# Patient Record
Sex: MALE | Race: BLACK OR AFRICAN AMERICAN | NOT HISPANIC OR LATINO | Employment: OTHER | URBAN - METROPOLITAN AREA
[De-identification: names, ages, dates, MRNs, and addresses within clinical notes are randomized per-mention and may not be internally consistent; named-entity substitution may affect disease eponyms.]

---

## 2018-03-26 ENCOUNTER — TREATMENT (OUTPATIENT)
Dept: PHYSICAL THERAPY | Facility: CLINIC | Age: 68
End: 2018-03-26

## 2018-03-26 ENCOUNTER — TRANSCRIBE ORDERS (OUTPATIENT)
Dept: PHYSICAL THERAPY | Facility: CLINIC | Age: 68
End: 2018-03-26

## 2018-03-26 DIAGNOSIS — Z96.652 H/O TOTAL KNEE REPLACEMENT, LEFT: Primary | ICD-10-CM

## 2018-03-26 DIAGNOSIS — G89.29 CHRONIC PAIN OF LEFT KNEE: Primary | ICD-10-CM

## 2018-03-26 DIAGNOSIS — Z47.89 ORTHOPEDIC AFTERCARE: ICD-10-CM

## 2018-03-26 DIAGNOSIS — M25.562 CHRONIC PAIN OF LEFT KNEE: Primary | ICD-10-CM

## 2018-03-26 DIAGNOSIS — Z96.652 TOTAL KNEE REPLACEMENT STATUS, LEFT: ICD-10-CM

## 2018-03-26 DIAGNOSIS — M25.562 LEFT KNEE PAIN, UNSPECIFIED CHRONICITY: ICD-10-CM

## 2018-03-26 PROCEDURE — 97110 THERAPEUTIC EXERCISES: CPT | Performed by: PHYSICAL THERAPIST

## 2018-03-26 PROCEDURE — 97161 PT EVAL LOW COMPLEX 20 MIN: CPT | Performed by: PHYSICAL THERAPIST

## 2018-03-26 NOTE — PROGRESS NOTES
Physical Therapy Initial Evaluation and Plan of Care    Patient: Sen Arellano   : 1950  Diagnosis/ICD-10 Code:  Chronic pain of left knee [M25.562, G89.29]  Referring practitioner: No ref. provider found  Date of Initial Visit: 3/26/2018  Today's Date: 3/26/2018  Patient seen for 1 sessions             Subjective Evaluation    History of Present Illness  Date of surgery: 3/1/2018  Mechanism of injury: Pt states that he has been having issues with the left knee for a while and eventually had surgery for left TKA. After he was discharged from the hospital he has home health PT up until 3/23/18. He feels like the pain has decreased with home health and he has been able to get around easier using the walker. He does have c/o pain in the back of the left knee where he caused a wound from wrapping his ice to tight.       Patient Occupation: N/A Quality of life: good    Pain  Current pain rating: 3  At best pain ratin  At worst pain ratin  Location: Left knee  Quality: dull ache  Relieving factors: ice, rest and medications  Aggravating factors: ambulation, standing and movement  Progression: improved    Social Support  Lives in: multiple-level home ([facility])    Treatments  Previous treatment: home therapy  Discharged from (in last 30 days): inpatient hospitalization and home health care  Patient Goals  Patient goals for therapy: decreased edema, decreased pain, increased motion, improved balance, increased strength and independence with ADLs/IADLs             Objective       Observations     Additional Observation Details  Open, but healing wound noted at the popliteal space of the left LE. Pt reports that he cut the back of his knee when he cinched his ice pack too tight on his knee     Palpation     Additional Palpation Details  TTP generally about the left knee     Active Range of Motion   Left Knee   Flexion: 99 degrees   Extension: Left knee active extension: -23.     Right Knee   Flexion: 110  degrees   Extension: Right knee active extension: -12.     Patellar Mobility   Left Knee Hypomobile in the left medial, left lateral, left superior and left inferior patellar tendon(s).     Strength/Myotome Testing     Left Knee   Flexion: 4  Extension: 4+    Ambulation     Ambulation: Level Surfaces     Additional Level Surfaces Ambulation Details  Pt ambulates with a rolling walker. He demonstrates decreased weight bearing on the LLE with a decrease in terminal knee extension and flexion during ambulation          Assessment & Plan     Assessment  Impairments: abnormal gait, abnormal muscle tone, abnormal or restricted ROM, activity intolerance, impaired balance, impaired physical strength, lacks appropriate home exercise program, pain with function and weight-bearing intolerance  Assessment details: Patient is a 68 year old male who comes to physical therapy s/p left TKA. Resulting in pain, decreased ROM, decreased strength, and inability to perform all essential functional activities. Pt will benefit from skilled PT services to address the above issues.     Prognosis details:   SHORT TERM GOALS:  2 weeks       1. Pt independent with HEP  2. Pt to demonstrate yojana hip strength 4/5 or greater to improve stability with ambulation  3. Pt to report being able to walk in the clinic without AD without increasing pain in the left knee    LONG TERM GOALS:   6 weeks  1. Pt to demonstrate ability to perform full functional squat with good form and control of the knees and without increasing pain  2. Pt to demonstrate yojana hip strength to 4+/5 or greater to improve safety with ambulation on uneven surfaces  3. Pt to demonstrate ability to perform step up/down 6 inch step x10 safely and without pain in the left knee     Functional Limitations: carrying objects, lifting, walking, uncomfortable because of pain, sitting and standing  Plan  Therapy options: will be seen for skilled physical therapy services  Planned modality  interventions: cryotherapy, electrical stimulation/Russian stimulation, high voltage pulsed current (pain management), iontophoresis, microcurrent electrical stimulation, TENS, thermotherapy (hydrocollator packs) and ultrasound  Planned therapy interventions: abdominal trunk stabilization, ADL retraining, balance/weight-bearing training, body mechanics training, flexibility, functional ROM exercises, gait training, home exercise program, IADL retraining, joint mobilization, manual therapy, motor coordination training, neuromuscular re-education, soft tissue mobilization, strengthening, stretching and therapeutic activities  Frequency: 2x week  Duration in weeks: 6  Treatment plan discussed with: patient        Manual Therapy:         mins  99674;  Therapeutic Exercise:    25     mins  42546;     Neuromuscular Fan:        mins  77271;    Therapeutic Activity:          mins  01880;     Gait Training:           mins  51895;     Ultrasound:          mins  01133;    Electrical Stimulation:         mins  75830 ( );  Dry Needling          mins self-pay    Timed Treatment:   25   mins   Total Treatment:     60   mins    PT SIGNATURE: Gerardo Painting, DANITZA   DATE TREATMENT INITIATED: 3/26/2018    Initial Certification  Certification Period: 6/24/2018  I certify that the therapy services are furnished while this patient is under my care.  The services outlined above are required by this patient, and will be reviewed every 90 days.     PHYSICIAN:       DATE:     Please sign and return via fax to [phone].. Thank you, [facility].

## 2018-03-30 ENCOUNTER — TREATMENT (OUTPATIENT)
Dept: PHYSICAL THERAPY | Facility: CLINIC | Age: 68
End: 2018-03-30

## 2018-03-30 DIAGNOSIS — Z96.652 TOTAL KNEE REPLACEMENT STATUS, LEFT: ICD-10-CM

## 2018-03-30 DIAGNOSIS — M25.562 CHRONIC PAIN OF LEFT KNEE: Primary | ICD-10-CM

## 2018-03-30 DIAGNOSIS — G89.29 CHRONIC PAIN OF LEFT KNEE: Primary | ICD-10-CM

## 2018-03-30 PROCEDURE — 97110 THERAPEUTIC EXERCISES: CPT | Performed by: PHYSICAL THERAPIST

## 2018-03-30 PROCEDURE — 97140 MANUAL THERAPY 1/> REGIONS: CPT | Performed by: PHYSICAL THERAPIST

## 2018-03-31 NOTE — PROGRESS NOTES
Physical Therapy Daily Progress Note    Subjective   Sen Arellano reports that he was sore after his last visit but he I feeling better now.     Objective   See Exercise, Manual, and Modality Logs for complete treatment.       Assessment/Plan     Improved PROM noted today in the clinic with manual therapy. WIll continue to progress activity as tolerated.     Progress per Plan of Care           Manual Therapy:    18     mins  22926;  Therapeutic Exercise:    32     mins  01224;     Neuromuscular Fan:        mins  11053;    Therapeutic Activity:          mins  57986;     Gait Training:           mins  05887;     Ultrasound:          mins  71734;    Electrical Stimulation:         mins  45298 ( );  Dry Needling          mins self-pay    Timed Treatment:   50   mins   Total Treatment:     58   mins    Gerardo Painting, PT  Physical Therapist

## 2018-04-02 ENCOUNTER — TREATMENT (OUTPATIENT)
Dept: PHYSICAL THERAPY | Facility: CLINIC | Age: 68
End: 2018-04-02

## 2018-04-02 DIAGNOSIS — G89.29 CHRONIC PAIN OF LEFT KNEE: Primary | ICD-10-CM

## 2018-04-02 DIAGNOSIS — M25.562 CHRONIC PAIN OF LEFT KNEE: Primary | ICD-10-CM

## 2018-04-02 DIAGNOSIS — Z96.652 TOTAL KNEE REPLACEMENT STATUS, LEFT: ICD-10-CM

## 2018-04-02 PROCEDURE — 97140 MANUAL THERAPY 1/> REGIONS: CPT | Performed by: PHYSICAL THERAPIST

## 2018-04-02 PROCEDURE — 97110 THERAPEUTIC EXERCISES: CPT | Performed by: PHYSICAL THERAPIST

## 2018-04-02 NOTE — PROGRESS NOTES
Physical Therapy Daily Progress Note    Subjective   Sen Arellano reports that he was sore after is last visit, but his knee pain seems to have been better. He feels more comfortable with walking in the home.     Objective   See Exercise, Manual, and Modality Logs for complete treatment.       Assessment/Plan     Progressed exercise in the clinic today with more isolated strengthening. Included the bike at the end of treatment to address any issues with endurance and ROM of the knee. Pt was able to perform a full revolution on the bike without pain.     Progress per Plan of Care and Progress strengthening /stabilization /functional activity           Manual Therapy:    18     mins  12856;  Therapeutic Exercise:    45     mins  12548;     Neuromuscular Fan:        mins  48221;    Therapeutic Activity:          mins  94226;     Gait Training:           mins  71011;     Ultrasound:          mins  71375;    Electrical Stimulation:         mins  61218 ( );  Dry Needling          mins self-pay    Timed Treatment:   63   mins   Total Treatment:     70   mins    Gerardo Painting PT  Physical Therapist

## 2018-04-06 ENCOUNTER — TREATMENT (OUTPATIENT)
Dept: PHYSICAL THERAPY | Facility: CLINIC | Age: 68
End: 2018-04-06

## 2018-04-06 DIAGNOSIS — G89.29 CHRONIC PAIN OF LEFT KNEE: ICD-10-CM

## 2018-04-06 DIAGNOSIS — Z96.652 TOTAL KNEE REPLACEMENT STATUS, LEFT: Primary | ICD-10-CM

## 2018-04-06 DIAGNOSIS — M25.562 CHRONIC PAIN OF LEFT KNEE: ICD-10-CM

## 2018-04-06 PROCEDURE — 97110 THERAPEUTIC EXERCISES: CPT | Performed by: PHYSICAL THERAPIST

## 2018-04-06 NOTE — PROGRESS NOTES
Physical Therapy Daily Progress Note    Patient: Sen Arellano   : 1950  Diagnosis/ICD-10 Code:  Total knee replacement status, left [Z96.652]  Referring practitioner: Eleanor Monreal NP  Date of Initial Visit: Type: THERAPY  Noted: 3/26/2018  Today's Date: 2018  Patient seen for 4 sessions             Subjective   Sen Arellano reports that he is feeling well today and only notices pain while standing and rated pain 3/10 in his left knee. Patient said he does not notice pain when he is sitting or lying down and rated it 0/10. Pt stated he has done some stretching at home. When asked about how he felt after his last visit he stated he felt a little sore like he had been working. Pt stated he feels like he still needs to use walker because of confidence issues.     Objective     See Exercise, Manual, and Modality Logs for complete treatment.       Assessment/Plan: Pt tolerated therapy well and continues to improve strength and ROM. Continued to progress exercises and added new ones to more standing exercises and walking without walker. Pt would benefit from more standing and walking exercises to improve mobility and increase pt's confidence. During standing side stepping pt required CGA with gait belt but would be appropriate for SBA.     Progress per Plan of Care and Progress strengthening /stabilization /functional activity           Manual Therapy:         mins  77977;  Therapeutic Exercise:    55     mins  81205;     Neuromuscular Fan:        mins  34243;    Therapeutic Activity:          mins  77232;     Gait Training:           mins  06685;     Ultrasound:          mins  84109;    Electrical Stimulation:         mins  43701 ( );  Iontophoresis          mins 36685   Traction          mins  51268  Fluidotherapy          mins  21951  Dry Needling          mins self-pay  Paraffin          mins  02609    Timed Treatment:   55   mins   Total Treatment:     75   mins    [employer] PT  Student    I was present in the PT Department guiding the student by approving, concurring, and confirming the skilled judgement for all services rendered.     Gerardo Painting, PT  Physical Therapist

## 2018-04-10 ENCOUNTER — TREATMENT (OUTPATIENT)
Dept: PHYSICAL THERAPY | Facility: CLINIC | Age: 68
End: 2018-04-10

## 2018-04-10 DIAGNOSIS — M25.562 CHRONIC PAIN OF LEFT KNEE: Primary | ICD-10-CM

## 2018-04-10 DIAGNOSIS — G89.29 CHRONIC PAIN OF LEFT KNEE: Primary | ICD-10-CM

## 2018-04-10 DIAGNOSIS — Z96.652 TOTAL KNEE REPLACEMENT STATUS, LEFT: ICD-10-CM

## 2018-04-10 PROCEDURE — 97110 THERAPEUTIC EXERCISES: CPT | Performed by: PHYSICAL THERAPIST

## 2018-04-10 PROCEDURE — 97140 MANUAL THERAPY 1/> REGIONS: CPT | Performed by: PHYSICAL THERAPIST

## 2018-04-10 PROCEDURE — 97530 THERAPEUTIC ACTIVITIES: CPT | Performed by: PHYSICAL THERAPIST

## 2018-04-10 NOTE — PROGRESS NOTES
Physical Therapy Daily Progress Note    Patient: Sen Arellano   : 1950  Diagnosis/ICD-10 Code:  Chronic pain of left knee [M25.562, G89.29]  Referring practitioner: Eleanor Monreal NP  Date of Initial Visit: Type: THERAPY  Noted: 3/26/2018  Today's Date: 4/10/2018  Patient seen for 5 sessions             Subjective   Sen Arellano reports that he is feeling well after the weekend and does not have any pain. The only time he has pain in his left knee is during sit to stand. Pt explained he had more pain in his right knee over the weekend. When asked if he was able to move around without the walker pt stated he was still hesitant and nervous and didn't try much. Stated he does have a cane and may try using that.     Objective     See Exercise, Manual, and Modality Logs for complete treatment.       Assessment/Plan: Pt continues to progress in left knee endurance and strength. Pt started to perform some exercises bilaterally to improve strength and reduce pain on right knee in order to improve safety and stability to ambulate. Pt was able to perform side stepping with SBA without a cane but fatigued and needed a break afterwards. Pt was educated on use of cane and also performed gait with SBA using cane to improve confidence. Continue to progress treatment to more functional standing exercises and improve endurance. Pt may benefit from performing ascending and descending stairs next visit.       Progress per Plan of Care and Progress strengthening /stabilization /functional activity           Manual Therapy:    15     mins  54920;  Therapeutic Exercise:    40     mins  60416;     Neuromuscular Fan:        mins  39104;    Therapeutic Activity:    10      mins  29443;     Gait Training:           mins  85823;     Ultrasound:          mins  74144;    Electrical Stimulation:         mins  09562 ( );  Iontophoresis          mins 97360   Traction          mins  83678  Fluidotherapy          mins  30335  Dry  Needling          mins self-pay  Paraffin          mins  50289    Timed Treatment:   65   mins   Total Treatment:     75   mins    [employer], PT Student    I was present in the PT Department guiding the student by approving, concurring, and confirming the skilled judgement for all services rendered.     Gerardo Painting, PT  Physical Therapist

## 2018-04-13 ENCOUNTER — TREATMENT (OUTPATIENT)
Dept: PHYSICAL THERAPY | Facility: CLINIC | Age: 68
End: 2018-04-13

## 2018-04-13 DIAGNOSIS — M25.562 CHRONIC PAIN OF LEFT KNEE: Primary | ICD-10-CM

## 2018-04-13 DIAGNOSIS — G89.29 CHRONIC PAIN OF LEFT KNEE: Primary | ICD-10-CM

## 2018-04-13 DIAGNOSIS — Z96.652 TOTAL KNEE REPLACEMENT STATUS, LEFT: ICD-10-CM

## 2018-04-13 PROCEDURE — 97110 THERAPEUTIC EXERCISES: CPT | Performed by: PHYSICAL THERAPIST

## 2018-04-13 PROCEDURE — 97530 THERAPEUTIC ACTIVITIES: CPT | Performed by: PHYSICAL THERAPIST

## 2018-04-13 PROCEDURE — 97140 MANUAL THERAPY 1/> REGIONS: CPT | Performed by: PHYSICAL THERAPIST

## 2018-04-13 NOTE — PROGRESS NOTES
Physical Therapy Daily Progress Note    Patient: Sen Arellano   : 1950  Diagnosis/ICD-10 Code:  Chronic pain of left knee [M25.562, G89.29]  Referring practitioner: Eleanor Monreal NP  Date of Initial Visit: Type: THERAPY  Noted: 3/26/2018  Today's Date: 2018  Patient seen for 6 sessions             Subjective   Sen Arellano reports that he is feeling better and started using his cane the past two days. Pt explained he does not have any pain in his left knee but feels pain in his right knee. Pt stated he feels more fatigue using his cane and walking from his car to therapy today was the longest distance he walked using his cane.     Objective       Active Range of Motion   Left Knee   Flexion: 106 degrees   Extension: Left knee active extension: -12.      Pt ambulated from car to therapy and throughout the clinic during therapy using a cane.     See Exercise, Manual, and Modality Logs for complete treatment.       Assessment/Plan: Pt continues to improve in strength and tolerance to activity during treatment. Pt was able to perform more challenging exercises today that will help him improve his mobility around his home. Pt continues to improve his endurance and during stair climbing pt needed several verbal cues and was educated on safety for ascending and descending. Continue to progress pt according to POC.     Progress per Plan of Care and Progress strengthening /stabilization /functional activity           Manual Therapy:    15     mins  44984;  Therapeutic Exercise:    40     mins  65548;     Neuromuscular Fan:        mins  04892;    Therapeutic Activity:     10     mins  65512;     Gait Training:           mins  90326;     Ultrasound:          mins  04971;    Electrical Stimulation:         mins  34192 ( );  Iontophoresis          mins 75688   Traction          mins  49465  Fluidotherapy          mins  84369  Dry Needling          mins self-pay  Paraffin         mins  25379    Timed  Treatment:   65   mins   Total Treatment:     78   mins    [employer], PT Student    I was present in the PT Department guiding the student by approving, concurring, and confirming the skilled judgement for all services rendered.     Gerardo Painting, PT  Physical Therapist

## 2018-04-17 ENCOUNTER — TREATMENT (OUTPATIENT)
Dept: PHYSICAL THERAPY | Facility: CLINIC | Age: 68
End: 2018-04-17

## 2018-04-17 DIAGNOSIS — M25.562 CHRONIC PAIN OF LEFT KNEE: ICD-10-CM

## 2018-04-17 DIAGNOSIS — Z96.652 TOTAL KNEE REPLACEMENT STATUS, LEFT: Primary | ICD-10-CM

## 2018-04-17 DIAGNOSIS — G89.29 CHRONIC PAIN OF LEFT KNEE: ICD-10-CM

## 2018-04-17 PROCEDURE — 97530 THERAPEUTIC ACTIVITIES: CPT | Performed by: PHYSICAL THERAPIST

## 2018-04-17 PROCEDURE — 97110 THERAPEUTIC EXERCISES: CPT | Performed by: PHYSICAL THERAPIST

## 2018-04-17 PROCEDURE — 97140 MANUAL THERAPY 1/> REGIONS: CPT | Performed by: PHYSICAL THERAPIST

## 2018-04-17 NOTE — PROGRESS NOTES
Physical Therapy Daily Progress Note    Patient: Sen Arellano   : 1950  Diagnosis/ICD-10 Code:  Total knee replacement status, left [Z96.652]  Referring practitioner: Eleanor Monreal NP  Date of Initial Visit: Type: THERAPY  Noted: 3/26/2018  Today's Date: 2018  Patient seen for 7 sessions             Subjective   Sen Arellano reports that his left knee is doing great and improving. He does not have any pain in his left knee except when he is sitting and stands up. PT explains he feels like when he puts all his weight through his leg. The majority of his pain is all his right knee and he feels its affecting his left. Pt went to dr mendoza yesterday and explained he is going to have a TKA on his right knee in .     Objective     AROM right knee flexion - 111    See Exercise, Manual, and Modality Logs for complete treatment.       Assessment/Plan: Pt continues to improve on strength and ROM with increased activity tolerance during therapy. Pt was able to increase the amount of reps during exercise with exacerbation of pain or soreness. Pt ambulated to therapy and during therapy with a cane. Pt able to perform sit to stands and stair climbing with more efficiency. Continue to work both knees bilaterally with as many exercises as possible. Pt may benefit from static and dynamic balance exercises    Progress per Plan of Care and Progress strengthening /stabilization /functional activity                  Manual Therapy:    15     mins  85416;  Therapeutic Exercise:    55   mins  09520;     Neuromuscular Fan:        mins  03631;    Therapeutic Activity:    10      mins  37967;     Gait Training:           mins  61889;     Ultrasound:          mins  70297;    Electrical Stimulation:         mins  47477 ( );  Iontophoresis          mins 95602   Traction          mins  90939  Fluidotherapy          mins  57017  Dry Needling          mins self-pay  Paraffin          mins  73288    Timed Treatment:   80    mins   Total Treatment:     90  mins    [employer], PT Student    I was present in the PT Department guiding the student by approving, concurring, and confirming the skilled judgement for all services rendered.     Gerardo Painting, PT  Physical Therapist

## 2018-04-20 ENCOUNTER — TREATMENT (OUTPATIENT)
Dept: PHYSICAL THERAPY | Facility: CLINIC | Age: 68
End: 2018-04-20

## 2018-04-20 DIAGNOSIS — Z96.652 TOTAL KNEE REPLACEMENT STATUS, LEFT: Primary | ICD-10-CM

## 2018-04-20 DIAGNOSIS — M25.562 CHRONIC PAIN OF LEFT KNEE: ICD-10-CM

## 2018-04-20 DIAGNOSIS — G89.29 CHRONIC PAIN OF LEFT KNEE: ICD-10-CM

## 2018-04-20 PROCEDURE — 97530 THERAPEUTIC ACTIVITIES: CPT | Performed by: PHYSICAL THERAPIST

## 2018-04-20 PROCEDURE — 97140 MANUAL THERAPY 1/> REGIONS: CPT | Performed by: PHYSICAL THERAPIST

## 2018-04-20 PROCEDURE — 97110 THERAPEUTIC EXERCISES: CPT | Performed by: PHYSICAL THERAPIST

## 2018-04-20 NOTE — PROGRESS NOTES
Physical Therapy Daily Progress Note    Patient: Sen Arellano   : 1950  Diagnosis/ICD-10 Code:  Total knee replacement status, left [Z96.652]  Referring practitioner: Eleanor Monreal NP  Date of Initial Visit: Type: THERAPY  Noted: 3/26/2018  Today's Date: 2018  Patient seen for 8 sessions             Subjective   Sen Arellano reports that he feeling good today and his left knee doesn't have any pain, but his right knee has been giving him aches and pain. Pt states he has been using his cane a lot more and feels more safe and confident while using it.     Objective     See Exercise, Manual, and Modality Logs for complete treatment.       Assessment/Plan: Pt continues to progress in strength and ROM. Pt was able to tolerate increased intensity of activities during treatment. Pt was able to perform sit to stand ands stair climbing with less fatigue and higher efficiency. Pt  ambulated with cane around clinic without any verbal cues. Continue to progress pt according to POC and potentially start working on static and dynamic balance.     Progress per Plan of Care and Progress strengthening /stabilization /functional activity           Manual Therapy:    20     mins  63464;  Therapeutic Exercise:    45    mins  75725;     Neuromuscular Fan:        mins  75096;    Therapeutic Activity:     10     mins  55386;     Gait Training:           mins  19419;     Ultrasound:          mins  26847;    Electrical Stimulation:         mins  03153 ( );  Iontophoresis          mins 97355   Traction          mins  78176  Fluidotherapy          mins  73462  Dry Needling          mins self-pay  Paraffin          mins  68937    Timed Treatment:   75   mins   Total Treatment:     85   mins    [employer], PT Student    I was present in the PT Department guiding the student by approving, concurring, and confirming the skilled judgement for all services rendered.     Gerardo Painting, PT  Physical Therapist

## 2018-04-24 ENCOUNTER — TREATMENT (OUTPATIENT)
Dept: PHYSICAL THERAPY | Facility: CLINIC | Age: 68
End: 2018-04-24

## 2018-04-24 DIAGNOSIS — M25.562 CHRONIC PAIN OF LEFT KNEE: ICD-10-CM

## 2018-04-24 DIAGNOSIS — Z96.652 TOTAL KNEE REPLACEMENT STATUS, LEFT: Primary | ICD-10-CM

## 2018-04-24 DIAGNOSIS — G89.29 CHRONIC PAIN OF LEFT KNEE: ICD-10-CM

## 2018-04-24 PROCEDURE — 97110 THERAPEUTIC EXERCISES: CPT | Performed by: PHYSICAL THERAPIST

## 2018-04-24 PROCEDURE — 97140 MANUAL THERAPY 1/> REGIONS: CPT | Performed by: PHYSICAL THERAPIST

## 2018-04-24 PROCEDURE — 97530 THERAPEUTIC ACTIVITIES: CPT | Performed by: PHYSICAL THERAPIST

## 2018-04-24 NOTE — PROGRESS NOTES
Physical Therapy Daily Progress Note    Patient: Sen Arellano   : 1950  Diagnosis/ICD-10 Code:  Total knee replacement status, left [Z96.652]  Referring practitioner: Eleanor Monreal NP  Date of Initial Visit: Type: THERAPY  Noted: 3/26/2018  Today's Date: 2018  Patient seen for 9 sessions             Subjective   Sen Arellano reports that he is feeling better and his left knee continues to improve. He states he feels like he is getting stronger and better range of motion. Pt stated he is able to ascend and descend stairs easier at home now.     Objective     See Exercise, Manual, and Modality Logs for complete treatment.       Assessment/Plan: Pt was able to tolerate treatment without exacerbation of pain. Pt tolerated manual therapy and was able to improve extension and flexion. Pt still lacks extension and needs to continue to work on that. Pt was able to tolerate and perform static balance exercise and can continue to progress to dynamic balance.       Progress per Plan of Care and Progress strengthening /stabilization /functional activity           Manual Therapy:    18     mins  65320;  Therapeutic Exercise:    40     mins  51526;     Neuromuscular Fan:        mins  09511;    Therapeutic Activity:     15     mins  77376;     Gait Training:           mins  97518;     Ultrasound:          mins  24017;    Electrical Stimulation:         mins  13288 ( );  Iontophoresis          mins 81977   Traction          mins  87834  Fluidotherapy          mins  02044  Dry Needling          mins self-pay  Paraffin          mins  07557    Timed Treatment:   73   mins   Total Treatment:     78   mins    [employer], PT Student    I was present in the PT Department guiding the student by approving, concurring, and confirming the skilled judgement for all services rendered.     Gerardo Painting, PT  Physical Therapist

## 2018-05-01 ENCOUNTER — TREATMENT (OUTPATIENT)
Dept: PHYSICAL THERAPY | Facility: CLINIC | Age: 68
End: 2018-05-01

## 2018-05-01 DIAGNOSIS — Z96.652 TOTAL KNEE REPLACEMENT STATUS, LEFT: Primary | ICD-10-CM

## 2018-05-01 DIAGNOSIS — G89.29 CHRONIC PAIN OF LEFT KNEE: ICD-10-CM

## 2018-05-01 DIAGNOSIS — M25.562 CHRONIC PAIN OF LEFT KNEE: ICD-10-CM

## 2018-05-01 PROCEDURE — 97110 THERAPEUTIC EXERCISES: CPT | Performed by: PHYSICAL THERAPIST

## 2018-05-01 PROCEDURE — 97140 MANUAL THERAPY 1/> REGIONS: CPT | Performed by: PHYSICAL THERAPIST

## 2018-05-01 PROCEDURE — 97530 THERAPEUTIC ACTIVITIES: CPT | Performed by: PHYSICAL THERAPIST

## 2018-05-01 NOTE — PROGRESS NOTES
Physical Therapy Daily Progress Note    Patient: Sen Arellano   : 1950  Diagnosis/ICD-10 Code:  Total knee replacement status, left [Z96.652]  Referring practitioner: Eleanor Monreal NP  Date of Initial Visit: Type: THERAPY  Noted: 3/26/2018  Today's Date: 2018  Patient seen for 10 sessions             Subjective   Sen Arellano reports that he is feeling about the same since his last visit he does not have much pain at all just achy in his left knee when he is standing from a seated position. Pt states his right knee continues to bother him more than left and feels it limits his progress.     Objective     See Exercise, Manual, and Modality Logs for complete treatment.       Assessment/Plan: Pt was able to tolerate increased activity during therapy without exacerbation of symptoms. Pt continues to progress in efficiency of movement but still lacks terminal extension. Continue to progress pt according to POC and work on balance and endurance.    Progress per Plan of Care and Progress strengthening /stabilization /functional activity           Manual Therapy:    18     mins  00697;  Therapeutic Exercise:   32    mins  78414;     Neuromuscular Fan:        mins  83293;    Therapeutic Activity:    15    mins  07425;     Gait Training:           mins  42749;     Ultrasound:          mins  17073;    Electrical Stimulation:         mins  24048 ( );  Iontophoresis          mins 97716   Traction          mins  44512  Fluidotherapy          mins  96056  Dry Needling          mins self-pay  Paraffin          mins  04012    Timed Treatment:   65   mins   Total Treatment:     75   mins    [employer], PT Student    I was present in the PT Department guiding the student by approving, concurring, and confirming the skilled judgement for all services rendered.     Gerardo Painting, PT  Physical Therapist

## 2018-05-03 ENCOUNTER — TREATMENT (OUTPATIENT)
Dept: PHYSICAL THERAPY | Facility: CLINIC | Age: 68
End: 2018-05-03

## 2018-05-03 DIAGNOSIS — Z96.652 TOTAL KNEE REPLACEMENT STATUS, LEFT: Primary | ICD-10-CM

## 2018-05-03 DIAGNOSIS — M25.562 CHRONIC PAIN OF LEFT KNEE: ICD-10-CM

## 2018-05-03 DIAGNOSIS — G89.29 CHRONIC PAIN OF LEFT KNEE: ICD-10-CM

## 2018-05-03 PROCEDURE — 97530 THERAPEUTIC ACTIVITIES: CPT | Performed by: PHYSICAL THERAPIST

## 2018-05-03 PROCEDURE — 97140 MANUAL THERAPY 1/> REGIONS: CPT | Performed by: PHYSICAL THERAPIST

## 2018-05-03 PROCEDURE — 97110 THERAPEUTIC EXERCISES: CPT | Performed by: PHYSICAL THERAPIST

## 2018-05-03 NOTE — PROGRESS NOTES
Physical Therapy Daily Progress Note    Patient: Sen Arellano   : 1950  Diagnosis/ICD-10 Code:  Total knee replacement status, left [Z96.652]  Referring practitioner: Eleanor Monreal NP  Date of Initial Visit: Type: THERAPY  Noted: 3/26/2018  Today's Date: 5/3/2018  Patient seen for 11 sessions             Subjective   Sen Arellano reports that there are no new changes. He continues to feel better with his left knee and his right knee bothers him more.    Objective     See Exercise, Manual, and Modality Logs for complete treatment.       Assessment/Plan: Pt was able to perform and tolerate increased difficulty with dynamic balance exercises. Pt did require SBA and did not have profound LOB but was slightly wobbly at the beginning. Pt has improved endurance. Continue to progress pt according to POC.     Progress per Plan of Care and Progress strengthening /stabilization /functional activity           Manual Therapy:    16     mins  09191;  Therapeutic Exercise:    35     mins  75869;     Neuromuscular Fan:        mins  92867;    Therapeutic Activity:     25     mins  63634;     Gait Training:           mins  98056;     Ultrasound:          mins  63533;    Electrical Stimulation:         mins  11575 ( );  Iontophoresis          mins 94519   Traction          mins  92239  Fluidotherapy          mins  65073  Dry Needling          mins self-pay  Paraffin          mins  57302    Timed Treatment:   76   mins   Total Treatment:     95   mins    [employer], PT Student    I was present in the PT Department guiding the student by approving, concurring, and confirming the skilled judgement for all services rendered.     Gerardo Painting, PT  Physical Therapist

## 2018-05-08 ENCOUNTER — TREATMENT (OUTPATIENT)
Dept: PHYSICAL THERAPY | Facility: CLINIC | Age: 68
End: 2018-05-08

## 2018-05-08 DIAGNOSIS — M25.562 CHRONIC PAIN OF LEFT KNEE: ICD-10-CM

## 2018-05-08 DIAGNOSIS — Z96.652 TOTAL KNEE REPLACEMENT STATUS, LEFT: Primary | ICD-10-CM

## 2018-05-08 DIAGNOSIS — G89.29 CHRONIC PAIN OF LEFT KNEE: ICD-10-CM

## 2018-05-08 PROCEDURE — 97530 THERAPEUTIC ACTIVITIES: CPT | Performed by: PHYSICAL THERAPIST

## 2018-05-08 PROCEDURE — 97110 THERAPEUTIC EXERCISES: CPT | Performed by: PHYSICAL THERAPIST

## 2018-05-08 PROCEDURE — 97140 MANUAL THERAPY 1/> REGIONS: CPT | Performed by: PHYSICAL THERAPIST

## 2018-05-08 NOTE — PROGRESS NOTES
Physical Therapy Daily Progress Note    Patient: Sen Arellano   : 1950  Diagnosis/ICD-10 Code:  Total knee replacement status, left [Z96.652]  Referring practitioner: Eleanor Monreal NP  Date of Initial Visit: Type: THERAPY  Noted: 3/26/2018  Today's Date: 2018  Patient seen for 12 sessions             Subjective   Sen Arellano reports that he is feeling much better and his left knee continues to get stronger. He states he still feels more pressure and pain in his right knee. Pt explained over the weekend he was able to get up and walk to bathroom without using his cane.     Objective     See Exercise, Manual, and Modality Logs for complete treatment.       Assessment/Plan Pt was able to tolerate increased activity without exacerbation of symptoms. Pt still continues to have more difficulty with balance exercises. Continue to progress static and dynamic balance exercises. Pt did respond with increased ROM following manual therapy. Continue to progress pt according to POC.     Progress per Plan of Care and Progress strengthening /stabilization /functional activity           Manual Therapy:    15     mins  42643;  Therapeutic Exercise:    35     mins  88443;     Neuromuscular Fan:        mins  87549;    Therapeutic Activity:     25     mins  84752;     Gait Training:           mins  89143;     Ultrasound:          mins  45586;    Electrical Stimulation:         mins  48242 ( );  Iontophoresis          mins 29019   Traction          mins  73332  Fluidotherapy          mins  92295  Dry Needling          mins self-pay  Paraffin          mins  11478    Timed Treatment:   75   mins   Total Treatment:     90   mins    P[employer], PT Student    I was present in the PT Department guiding the student by approving, concurring, and confirming the skilled judgement for all services rendered.     Gerardo Painting, PT  Physical Therapist

## 2018-05-22 ENCOUNTER — TREATMENT (OUTPATIENT)
Dept: PHYSICAL THERAPY | Facility: CLINIC | Age: 68
End: 2018-05-22

## 2018-05-22 DIAGNOSIS — G89.29 CHRONIC PAIN OF LEFT KNEE: ICD-10-CM

## 2018-05-22 DIAGNOSIS — M25.562 CHRONIC PAIN OF LEFT KNEE: ICD-10-CM

## 2018-05-22 DIAGNOSIS — Z96.652 TOTAL KNEE REPLACEMENT STATUS, LEFT: Primary | ICD-10-CM

## 2018-05-22 PROCEDURE — 97110 THERAPEUTIC EXERCISES: CPT | Performed by: PHYSICAL THERAPIST

## 2018-05-22 PROCEDURE — 97530 THERAPEUTIC ACTIVITIES: CPT | Performed by: PHYSICAL THERAPIST

## 2018-05-22 PROCEDURE — 97140 MANUAL THERAPY 1/> REGIONS: CPT | Performed by: PHYSICAL THERAPIST

## 2018-05-22 NOTE — PROGRESS NOTES
Re-Assessment / Re-Certification    Time In: 1:00     Time Out: 2:30    Patient: Sen Arellano   : 1950  Diagnosis/ICD-10 Code:  Total knee replacement status, left [Z96.652]  Referring practitioner: Eleanor Monreal NP  Date of Initial Visit: 2018  Today's Date: 2018  Patient seen for 13 sessions      Subjective:   Sen Arellano reports that his left knee is feeling better and he does not have constant pain. Pt reports the only time he has pain in his left knee is while he is trying to stand up from a seated position. Pt is still using cane to ambulate at home and in the community. Pt states he has more constant pain in his right knee.     Clinical Progress: improved  Home Program Compliance: Yes  Treatment has included: therapeutic exercise, neuromuscular re-education, manual therapy and therapeutic activity    Subjective   Objective       Active Range of Motion   Left Knee   Flexion: 110 degrees   Extension: Left knee active extension: -11.     Strength/Myotome Testing     Left Hip   Planes of Motion   Flexion: 4+  Abduction: 4+    Left Knee   Flexion: 4+  Prone flexion: 4+     Assessment/Plan: Pt was able to tolerate treatment without any therapy for several weeks without exacerbation of symptoms. Pt still lacks full extesion but has functional ROM in left knee. Pt has demonstrated consistent progress with ROM, strength while rehabbing his left knee and improved strength for prehab of right knee. Pt still needs improvement in endurance and strength for performing functional activities while progressing towards not using AD while ambulating. Continue to progress pt according to POC.     Progress toward previous goals: Partially Met    New Goals  Short-term goals (STG): no new goals at this time  Long-term goals (LTG): no new goals at this time      Recommendations: Continue as planned  Timeframe: 1 month  Prognosis to achieve goals: dedrick Painting, PT Student    I was present in the PT  Department guiding the student by approving, concurring, and confirming the skilled judgement for all services rendered.     PT Signature: Gerardo Painting PT      Based upon review of the patient's progress and continued therapy plan, it is my medical opinion that Sen Arellano should continue physical therapy treatment at [facility] PH[facility]  [address] [place] [address], [address]  [facility] [address] [phone].    Signature: __________________________________  Eleanor Monreal NP    Manual Therapy:    15     mins  56728;  Therapeutic Exercise:    35     mins  88718;     Neuromuscular Fan:        mins  05689;    Therapeutic Activity:     25     mins  92859;     Gait Training:           mins  67491;     Ultrasound:          mins  92434;    Electrical Stimulation:         mins  13388 ( );  Dry Needling          mins self-pay    Timed Treatment:   75   mins   Total Treatment:     90   mins

## 2018-05-25 ENCOUNTER — TREATMENT (OUTPATIENT)
Dept: PHYSICAL THERAPY | Facility: CLINIC | Age: 68
End: 2018-05-25

## 2018-05-25 DIAGNOSIS — M25.562 CHRONIC PAIN OF LEFT KNEE: ICD-10-CM

## 2018-05-25 DIAGNOSIS — G89.29 CHRONIC PAIN OF LEFT KNEE: ICD-10-CM

## 2018-05-25 DIAGNOSIS — Z96.652 TOTAL KNEE REPLACEMENT STATUS, LEFT: Primary | ICD-10-CM

## 2018-05-25 PROCEDURE — 97530 THERAPEUTIC ACTIVITIES: CPT | Performed by: PHYSICAL THERAPIST

## 2018-05-25 PROCEDURE — 97140 MANUAL THERAPY 1/> REGIONS: CPT | Performed by: PHYSICAL THERAPIST

## 2018-05-25 PROCEDURE — 97110 THERAPEUTIC EXERCISES: CPT | Performed by: PHYSICAL THERAPIST

## 2018-05-25 NOTE — PROGRESS NOTES
Physical Therapy Daily Progress Note    Patient: Sen Arellano   : 1950  Diagnosis/ICD-10 Code:  No primary diagnosis found.  Referring practitioner: Eleanor Monreal NP  Date of Initial Visit: No linked episodes  Today's Date: 2018  Patient seen for Visit count could not be calculated. Make sure you are using a visit which is associated with an episode. sessions             Subjective   Sen Arellano does not report any new complaints. States his left knee is feeling good and only painful when standing from seated position. His right knee was swollen and painful the past few days and he iced it to reduce both.     Objective     See Exercise, Manual, and Modality Logs for complete treatment.       Assessment/Plan: Pt was able to tolerate treatment without exacerbation of symptoms and continues to show improvement in strength and endurance for left knee. Discussed with patient coming one time a week for the next 3 weeks leading up to his right knee surgery and pt agreed and wants to do that. Educated pt on important exercises to perform at home and pt stated he understood. Continue to progress pt according to his POC.    Progress per Plan of Care and Progress strengthening /stabilization /functional activity           Manual Therapy:    15     mins  77179;  Therapeutic Exercise:    40     mins  58336;     Neuromuscular Fan:        mins  78085;    Therapeutic Activity:     25     mins  69345;     Gait Training:           mins  88993;     Ultrasound:          mins  63941;    Electrical Stimulation:         mins  73846 ( );  Iontophoresis          mins 17911   Traction          mins  09300  Fluidotherapy          mins  93144  Dry Needling          mins self-pay  Paraffin          mins  37703    Timed Treatment:   80   mins   Total Treatment:     90   mins    P[employer] PT Student    I was present in the PT Department guiding the student by approving, concurring, and confirming the skilled  judgement for all services rendered.  Gerardo Painting, PT  Physical Therapist

## 2018-05-29 ENCOUNTER — TREATMENT (OUTPATIENT)
Dept: PHYSICAL THERAPY | Facility: CLINIC | Age: 68
End: 2018-05-29

## 2018-05-29 DIAGNOSIS — G89.29 CHRONIC PAIN OF LEFT KNEE: ICD-10-CM

## 2018-05-29 DIAGNOSIS — M25.562 CHRONIC PAIN OF LEFT KNEE: ICD-10-CM

## 2018-05-29 DIAGNOSIS — Z96.652 TOTAL KNEE REPLACEMENT STATUS, LEFT: Primary | ICD-10-CM

## 2018-05-29 PROCEDURE — 97140 MANUAL THERAPY 1/> REGIONS: CPT | Performed by: PHYSICAL THERAPIST

## 2018-05-29 PROCEDURE — 97530 THERAPEUTIC ACTIVITIES: CPT | Performed by: PHYSICAL THERAPIST

## 2018-05-29 PROCEDURE — 97110 THERAPEUTIC EXERCISES: CPT | Performed by: PHYSICAL THERAPIST

## 2018-05-29 NOTE — PROGRESS NOTES
Physical Therapy Daily Progress Note    Patient: Sen Arellano   : 1950  Diagnosis/ICD-10 Code:  No primary diagnosis found.  Referring practitioner: Eleanor Monreal NP  Date of Initial Visit: Type: THERAPY  Noted: 3/26/2018  Today's Date: 2018  Patient seen for 15 sessions             Subjective   Sen Arellano does not report any new complaints. Pt states he believes coming once a week will work fine until his surgery on right knee. Pt reports he was able to work on some his exercises from his HEP.     Objective     See Exercise, Manual, and Modality Logs for complete treatment.       Assessment/Plan: Pt was able to tolerate increased activity without exacerbation of symptoms. Pt was able to demonstrate improvement with activities performed on stairs to improve overall function. Continue to assess pt next visit to make sure 1 visit a week is appropriate. Progress treatment as needed according to POC.     Progress per Plan of Care and Progress strengthening /stabilization /functional activity           Manual Therapy:    15     mins  07916;  Therapeutic Exercise:    35     mins  06397;     Neuromuscular Fan:        mins  22073;    Therapeutic Activity:     30     mins  53681;     Gait Training:           mins  84805;     Ultrasound:          mins  14396;    Electrical Stimulation:         mins  89023 ( );  Iontophoresis          mins 63652   Traction          mins  84969  Fluidotherapy          mins  50168  Dry Needling          mins self-pay  Paraffin          mins  23094    Timed Treatment:   80   mins   Total Treatment:     90  mins    [employer], PT Student    I was present in the PT Department guiding the student by approving, concurring, and confirming the skilled judgement for all services rendered.     Gerardo Painting, PT  Physical Therapist

## 2018-06-06 ENCOUNTER — TREATMENT (OUTPATIENT)
Dept: PHYSICAL THERAPY | Facility: CLINIC | Age: 68
End: 2018-06-06

## 2018-06-06 DIAGNOSIS — M25.562 CHRONIC PAIN OF LEFT KNEE: ICD-10-CM

## 2018-06-06 DIAGNOSIS — Z96.652 TOTAL KNEE REPLACEMENT STATUS, LEFT: Primary | ICD-10-CM

## 2018-06-06 DIAGNOSIS — G89.29 CHRONIC PAIN OF LEFT KNEE: ICD-10-CM

## 2018-06-06 PROCEDURE — 97110 THERAPEUTIC EXERCISES: CPT | Performed by: PHYSICAL THERAPIST

## 2018-06-08 NOTE — PROGRESS NOTES
Physical Therapy Daily Progress Note    Subjective   Sen Arellano reports that his left knee is doing really well but his right knee is more painful. He does feel like he has been getting stronger in both legs with therapy.     Objective   See Exercise, Manual, and Modality Logs for complete treatment.       Assessment/Plan     Pt is progressing well and he is able to perform all exercises in the clinic without having an exacerbation of symptoms in the left knee. Will continue with progression of strengthening of yojana knees to improve outcomes for the next surgery.     Progress per Plan of Care and Progress strengthening /stabilization /functional activity           Manual Therapy:         mins  38027;  Therapeutic Exercise:    58     mins  69919;     Neuromuscular Fan:        mins  26726;    Therapeutic Activity:          mins  87795;     Gait Training:           mins  00436;     Ultrasound:          mins  42537;    Electrical Stimulation:         mins  82162 ( );  Dry Needling          mins self-pay    Timed Treatment:   58   mins   Total Treatment:     64   mins    Gerardo Painting PT  Physical Therapist

## 2018-06-12 ENCOUNTER — TREATMENT (OUTPATIENT)
Dept: PHYSICAL THERAPY | Facility: CLINIC | Age: 68
End: 2018-06-12

## 2018-06-12 DIAGNOSIS — Z96.652 TOTAL KNEE REPLACEMENT STATUS, LEFT: Primary | ICD-10-CM

## 2018-06-12 DIAGNOSIS — M25.562 CHRONIC PAIN OF LEFT KNEE: ICD-10-CM

## 2018-06-12 DIAGNOSIS — G89.29 CHRONIC PAIN OF LEFT KNEE: ICD-10-CM

## 2018-06-12 PROCEDURE — 97110 THERAPEUTIC EXERCISES: CPT | Performed by: PHYSICAL THERAPIST

## 2018-06-13 NOTE — PROGRESS NOTES
Physical Therapy Daily Progress Note    Subjective   Sen Arellano reports that his left knee feels much stronger and he feels very good with therapy. He is having a right TKA next Tuesday and plans on returning here for treatment.     Objective   See Exercise, Manual, and Modality Logs for complete treatment.       Assessment/Plan     Pt has progressed very well and he demonstrates significant improvement in his knee AROM, strength, and ability to perform functional activitiy in the clinic.     Other           Manual Therapy:         mins  24507;  Therapeutic Exercise:    64     mins  29722;     Neuromuscular Fan:        mins  05404;    Therapeutic Activity:          mins  56527;     Gait Training:           mins  18534;     Ultrasound:          mins  93116;    Electrical Stimulation:         mins  47905 ( );  Dry Needling          mins self-pay    Timed Treatment:   64   mins   Total Treatment:     68   mins    Gerardo Painting PT  Physical Therapist

## 2018-07-11 ENCOUNTER — TREATMENT (OUTPATIENT)
Dept: PHYSICAL THERAPY | Facility: CLINIC | Age: 68
End: 2018-07-11

## 2018-07-11 DIAGNOSIS — Z96.651 S/P TOTAL KNEE ARTHROPLASTY, RIGHT: ICD-10-CM

## 2018-07-11 DIAGNOSIS — G89.29 CHRONIC PAIN OF RIGHT KNEE: Primary | ICD-10-CM

## 2018-07-11 DIAGNOSIS — M25.561 CHRONIC PAIN OF RIGHT KNEE: Primary | ICD-10-CM

## 2018-07-11 PROCEDURE — G8978 MOBILITY CURRENT STATUS: HCPCS | Performed by: PHYSICAL THERAPIST

## 2018-07-11 PROCEDURE — 97161 PT EVAL LOW COMPLEX 20 MIN: CPT | Performed by: PHYSICAL THERAPIST

## 2018-07-11 PROCEDURE — G8979 MOBILITY GOAL STATUS: HCPCS | Performed by: PHYSICAL THERAPIST

## 2018-07-11 NOTE — PROGRESS NOTES
Physical Therapy Initial Evaluation and Plan of Care    Patient: Sen Arellano   : 1950  Diagnosis/ICD-10 Code:  Chronic pain of right knee [M25.561, G89.29]  Referring practitioner: No ref. provider found  Date of Initial Visit: 2018  Today's Date: 2018  Patient seen for 1 sessions             Subjective Evaluation    History of Present Illness  Date of surgery: 2018  Mechanism of injury: Pt has been dealing with knee pain for several years before electing to have knee replacement procedures. He went through some injections in the knees but did not get much relief, he had the left replaced on 3/1/18 and underwent therapy at this location for s/p TKA. At the time of DC he was feeling much better in the left knee but was more limited with functional activitiy due to pain in the right knee. After DC from the hospital after surgery he did not have home health PT and he decided to wait until his MD follow-up visit to start therapy. He does report that he did some light exercise at home on his own.       Patient Occupation: retired Quality of life: good    Pain  Current pain rating: 3  At best pain ratin  At worst pain ratin  Location: front of the knee, below the knee cap  Quality: dull ache  Relieving factors: ice  Aggravating factors: ambulation and movement  Progression: improved    Social Support  Lives in: multiple-level home  Lives with: significant other    Diagnostic Tests  No diagnostic tests performed    Treatments  No previous or current treatments  Patient Goals  Patient goals for therapy: decreased edema, decreased pain, improved balance, increased motion, increased strength and independence with ADLs/IADLs             Objective       Palpation     Additional Palpation Details  General TTP noted about the right knee     Active Range of Motion   Left Knee   Flexion: 117 degrees   Extension: 17 degrees     Right Knee   Flexion: 104 degrees   Extension: 35 degrees     Patellar  Mobility     Right Knee Hypomobile in the medial and lateral patellar tendon(s).     Strength/Myotome Testing     Right Knee   Flexion: 4  Prone flexion: 4    Ambulation     Ambulation: Level Surfaces     Additional Level Surfaces Ambulation Details  Pt ambulates with a rolling walker, he demonstrates a decreased weight bearing on the right LE with toeing out of the right foot and moderately decreased terminal knee extension in stance. Limited knee flexion noted during ambulation as well          Assessment & Plan     Assessment  Impairments: abnormal gait, abnormal muscle tone, abnormal or restricted ROM, activity intolerance, impaired balance, impaired physical strength, lacks appropriate home exercise program, pain with function and weight-bearing intolerance  Assessment details: Patient is a 68 year old male who comes to physical therapy s/p right TKA resulting in pain, decreased ROM, decreased strength, and inability to perform all essential functional activities. Pt will benefit from skilled PT services to address the above issues.     Prognosis details:   SHORT TERM GOALS:  2 weeks       1. Pt independent with HEP  2. Pt to demonstrate yojana hip strength 4/5 or greater to improve stability with ambulation  3. Pt to report being able to walk for 10 minutes without increasing pain in the right knee    LONG TERM GOALS:   6 weeks  1. Pt to demonstrate ability to perform full functional squat with good form and control of the knees and without increasing pain  2. Pt to demonstrate yojana hip strength to 4+/5 or greater to improve safety with ambulation on uneven surfaces  3. Pt to return to work full duty without increased pain in the right knee   4. Pt to demonstrate ability to perform step up/down 8 inch step x10 safely and without pain in the right knee     Functional Limitations: carrying objects, lifting, walking, uncomfortable because of pain, sitting and standing  Plan  Therapy options: will be seen for skilled  physical therapy services  Planned modality interventions: cryotherapy, electrical stimulation/Russian stimulation, high voltage pulsed current (pain management), iontophoresis, microcurrent electrical stimulation, TENS, thermotherapy (hydrocollator packs) and ultrasound  Planned therapy interventions: abdominal trunk stabilization, ADL retraining, balance/weight-bearing training, body mechanics training, flexibility, functional ROM exercises, gait training, home exercise program, IADL retraining, joint mobilization, manual therapy, motor coordination training, neuromuscular re-education, soft tissue mobilization, strengthening, stretching and therapeutic activities  Frequency: 2x week  Duration in weeks: 6  Treatment plan discussed with: patient      Evaluation Only    PT SIGNATURE: Gerardo Painting, DANITZA   DATE TREATMENT INITIATED: 7/11/2018    Initial Certification  Certification Period: 10/9/2018  I certify that the therapy services are furnished while this patient is under my care.  The services outlined above are required by this patient, and will be reviewed every 90 days.     PHYSICIAN:       DATE:     Please sign and return via fax to [phone].. Thank you, [facility].

## 2018-07-12 ENCOUNTER — TRANSCRIBE ORDERS (OUTPATIENT)
Dept: PHYSICAL THERAPY | Facility: CLINIC | Age: 68
End: 2018-07-12

## 2018-07-12 DIAGNOSIS — Z96.651 STATUS POST TOTAL RIGHT KNEE REPLACEMENT: Primary | ICD-10-CM

## 2018-07-19 ENCOUNTER — TREATMENT (OUTPATIENT)
Dept: PHYSICAL THERAPY | Facility: CLINIC | Age: 68
End: 2018-07-19

## 2018-07-19 DIAGNOSIS — G89.29 CHRONIC PAIN OF RIGHT KNEE: Primary | ICD-10-CM

## 2018-07-19 DIAGNOSIS — M25.561 CHRONIC PAIN OF RIGHT KNEE: Primary | ICD-10-CM

## 2018-07-19 DIAGNOSIS — Z96.651 S/P TOTAL KNEE ARTHROPLASTY, RIGHT: ICD-10-CM

## 2018-07-19 PROCEDURE — 97140 MANUAL THERAPY 1/> REGIONS: CPT | Performed by: PHYSICAL THERAPIST

## 2018-07-19 PROCEDURE — 97110 THERAPEUTIC EXERCISES: CPT | Performed by: PHYSICAL THERAPIST

## 2018-07-23 NOTE — PROGRESS NOTES
Physical Therapy Daily Progress Note    Subjective   Sen Arellano reports that he has not been able to do many exercies at home due to other issues. He feels better with walking since his last visit.     Objective   See Exercise, Manual, and Modality Logs for complete treatment.       Assessment/Plan     Pt is now using a cane instead of the walker. He demonstrates very good knee flexion but still has significant limitation into extension.     Progress per Plan of Care and Progress strengthening /stabilization /functional activity           Manual Therapy:    16     mins  11050;  Therapeutic Exercise:    40     mins  70036;     Neuromuscular Fan:        mins  17058;    Therapeutic Activity:          mins  31369;     Gait Training:           mins  29517;     Ultrasound:          mins  36593;    Electrical Stimulation:         mins  66566 ( );  Dry Needling          mins self-pay    Timed Treatment:   56   mins   Total Treatment:     62   mins    Gerardo Painting PT  Physical Therapist

## 2018-07-27 ENCOUNTER — TREATMENT (OUTPATIENT)
Dept: PHYSICAL THERAPY | Facility: CLINIC | Age: 68
End: 2018-07-27

## 2018-07-27 DIAGNOSIS — M25.561 CHRONIC PAIN OF RIGHT KNEE: Primary | ICD-10-CM

## 2018-07-27 DIAGNOSIS — Z96.651 S/P TOTAL KNEE ARTHROPLASTY, RIGHT: ICD-10-CM

## 2018-07-27 DIAGNOSIS — G89.29 CHRONIC PAIN OF RIGHT KNEE: Primary | ICD-10-CM

## 2018-07-27 PROCEDURE — 97140 MANUAL THERAPY 1/> REGIONS: CPT | Performed by: PHYSICAL THERAPIST

## 2018-07-27 PROCEDURE — 97110 THERAPEUTIC EXERCISES: CPT | Performed by: PHYSICAL THERAPIST

## 2018-07-27 NOTE — PROGRESS NOTES
Physical Therapy Daily Progress Note        Patient: Sen Arellano   : 1950  Diagnosis/ICD-10 Code:  Chronic pain of right knee [M25.561, G89.29]  Referring practitioner: Eleanor Monreal NP  Date of Initial Visit: Type: THERAPY  Noted: 2018  Today's Date: 2018  Patient seen for 3 sessions           Subjective   Sen Arellano reports: Knee pain with sit to stand. Overall some better.    Objective   SEE LETTER TO MD IN MEDIA  See Exercise, Manual, and Modality Logs for complete treatment.       Assessment/Plan  SEE LETTER TO MD IN MEDIA  Progress per Plan of Care and Progress strengthening /stabilization /functional activity           Manual Therapy:    10     mins  93759;  Therapeutic Exercise:    52     mins  62791;     Neuromuscular Fan:        mins  62216;    Therapeutic Activity:          mins  25919;     Gait Training:           mins  56900;     Ultrasound:          mins  77313;    Electrical Stimulation:         mins  43852 ( );  Iontophoresis          mins 15554   Traction          mins  68849  Fluidotherapy          mins  04617  Dry Needling          mins self-pay  Paraffin          mins  74619    Timed Treatment:   62   mins   Total Treatment:     62   mins    Gagandeep Claudio, PT  Physical Therapist

## 2024-06-24 ENCOUNTER — DOCUMENTATION (OUTPATIENT)
Dept: FAMILY MEDICINE CLINIC | Facility: CLINIC | Age: 74
End: 2024-06-24
Payer: MEDICARE

## 2024-06-24 RX ORDER — MULTIPLE VITAMINS W/ MINERALS TAB 9MG-400MCG
1 TAB ORAL DAILY
COMMUNITY

## 2024-06-24 RX ORDER — FOLIC ACID 1 MG/1
1 TABLET ORAL DAILY
COMMUNITY

## 2024-06-24 RX ORDER — ALLOPURINOL 300 MG/1
300 TABLET ORAL DAILY
COMMUNITY

## 2024-06-24 RX ORDER — ACETAMINOPHEN 325 MG/1
650 TABLET ORAL EVERY 6 HOURS PRN
COMMUNITY

## 2024-06-24 RX ORDER — AMLODIPINE BESYLATE 5 MG/1
5 TABLET ORAL DAILY
COMMUNITY

## 2024-06-24 RX ORDER — DOCUSATE SODIUM 100 MG/1
100 CAPSULE, LIQUID FILLED ORAL 2 TIMES DAILY PRN
COMMUNITY

## 2024-06-24 RX ORDER — METHOCARBAMOL 500 MG/1
500 TABLET, FILM COATED ORAL 3 TIMES DAILY
COMMUNITY

## 2024-06-24 RX ORDER — LIDOCAINE 50 MG/G
1 PATCH TOPICAL EVERY 24 HOURS
COMMUNITY

## 2024-06-24 RX ORDER — TRAMADOL HYDROCHLORIDE 50 MG/1
50 TABLET ORAL EVERY 8 HOURS PRN
COMMUNITY

## 2024-06-24 RX ORDER — ATORVASTATIN CALCIUM 80 MG/1
80 TABLET, FILM COATED ORAL NIGHTLY
COMMUNITY

## 2024-06-24 RX ORDER — HYDROCODONE BITARTRATE AND ACETAMINOPHEN 10; 325 MG/1; MG/1
1 TABLET ORAL EVERY 8 HOURS PRN
COMMUNITY

## 2024-06-24 RX ORDER — TAMSULOSIN HYDROCHLORIDE 0.4 MG/1
1 CAPSULE ORAL DAILY
COMMUNITY

## 2024-06-26 NOTE — PROGRESS NOTES
Nursing Home History and Physical       Robert Salguero DO  [address] [place] [address] Phone: [phone]  Fax: [phone]     PATIENT NAME: Sen Arellano                                                                          YOB: 1950           DATE OF SERVICE: 2024  FACILITY:  [facility]    CHIEF COMPLAINT:  Nursing facility admission      HISTORY OF PRESENT ILLNESS:      Patient is a 74-year-old male with a history of renal cell cancer status post left nephrectomy, hypertension, hyperlipidemia, cognitive impairment, RAFAEL, and alcoholism recently hospitalized at [facility] for progressive right hip pain over 1 month span.  Patient was hospitalized and treated with pain management for acute symptoms and arrangements were made for further follow-up as outpatient with orthopedics.  Due to debility and weakness, patient was transferred to this facility for further care and management.    On exam today, patient was sitting up comfortably in his wheelchair and appeared to be content but was in brief due to the loss of his brother who  earlier this morning in the hospital.  Family was at bedside and was supportive.  Patient did share that he does have an appointment with orthopedic surgery next week to consider appropriate options.  For now, his pain appears to be in reasonable control.  Hydroxyzine was started in the nursing facility a couple of days ago due to panic attacks.  It seems to be working well for him so far.      PAST MEDICAL & SURGICAL HISTORY:   Past Medical History:   Diagnosis Date    Alcohol abuse     Bilateral knee pain     Gout     Hyperlipidemia     Hypertension     Mild cognitive impairment     RAFAEL (obstructive sleep apnea)     Osteoarthritis     Renal cell cancer, left     Right hip pain     Unilateral osteoarthritis resulting from hip dysplasia, right hip     Vitamin deficiency       Past Surgical History:   Procedure Laterality Date     JOINT REPLACEMENT      NEPHRECTOMY Left          MEDICATIONS:  I have reviewed and reconciled the patients medication list in the patients chart at the [facility] nursing [facility] on 06/27/2024.      ALLERGIES:  No Known Allergies      SOCIAL HISTORY:  Social History     Socioeconomic History    Marital status:    Tobacco Use    Smoking status: Never    Smokeless tobacco: Never   Substance and Sexual Activity    Alcohol use: Yes     Alcohol/week: 1.0 standard drink of alcohol     Types: 1 Shots of liquor per week     Comment: STATES THAT HE DRINKS 1 SHOT OF BOURBON A DAY AND ALSO DRINKS BEER.    Drug use: Never    Sexual activity: Defer       FAMILY HISTORY:  Family History   Problem Relation Age of Onset    Diabetes Other         REVIEW OF SYSTEMS:  Review of Systems   Constitutional:  Negative for chills, fatigue and fever.   HENT:  Negative for congestion, ear pain, rhinorrhea, sinus pressure and sore throat.    Eyes:  Negative for visual disturbance.   Respiratory:  Negative for cough, chest tightness, shortness of breath and wheezing.    Cardiovascular:  Negative for chest pain, palpitations and leg swelling.   Gastrointestinal:  Negative for abdominal pain, blood in stool, constipation, diarrhea, nausea and vomiting.   Endocrine: Negative for polydipsia and polyuria.   Genitourinary:  Negative for dysuria and hematuria.   Musculoskeletal:  Positive for arthralgias. Negative for back pain.   Skin:  Negative for rash.   Neurological:  Negative for dizziness, light-headedness, numbness and headaches.   Psychiatric/Behavioral:  Negative for dysphoric mood and sleep disturbance. The patient is not nervous/anxious.          PHYSICAL EXAMINATION:   VITAL SIGNS: BP 94/62   Pulse 96   Temp 97.7 °F (36.5 °C)   Resp 18   Wt 125 kg (275 lb 6.4 oz)   SpO2 95%     Physical Exam  Vitals and nursing note reviewed.   Constitutional:       Appearance: Normal appearance. He is well-developed.      Comments:  Wheelchair-bound elderly male in no acute distress   HENT:      Head: Normocephalic and atraumatic.      Nose: Nose normal.      Mouth/Throat:      Mouth: Mucous membranes are moist.      Pharynx: No oropharyngeal exudate.   Eyes:      General: No scleral icterus.     Conjunctiva/sclera: Conjunctivae normal.      Pupils: Pupils are equal, round, and reactive to light.   Neck:      Thyroid: No thyromegaly.   Cardiovascular:      Rate and Rhythm: Normal rate and regular rhythm.      Heart sounds: Normal heart sounds. No murmur heard.     No friction rub. No gallop.   Pulmonary:      Effort: Pulmonary effort is normal. No respiratory distress.      Breath sounds: Normal breath sounds. No wheezing.   Abdominal:      General: Bowel sounds are normal. There is no distension.      Palpations: Abdomen is soft.      Tenderness: There is no abdominal tenderness.   Musculoskeletal:         General: No deformity or signs of injury.      Cervical back: Normal range of motion and neck supple.   Lymphadenopathy:      Cervical: No cervical adenopathy.   Skin:     General: Skin is warm and dry.      Findings: No rash.   Neurological:      Mental Status: He is alert and oriented to person, place, and time.   Psychiatric:         Mood and Affect: Mood normal.         Behavior: Behavior normal.         RECORDS REVIEW:   Discharge summary [facility] 6/20/2024 reviewed    ASSESSMENT   Diagnoses and all orders for this visit:    1. Right hip pain (Primary)    2. Polyarthralgia    3. Anxiety    4. Debility    5. Memory loss    6. Renal cell carcinoma, left    7. H/O left radical nephrectomy    8. Alcoholism        PLAN    Right hip pain  - Continue PT and OT in rehab facility.  Pain is in reasonable control with current medication regimen.  Patient does have an appointment with orthopedics to discuss treatment options which may include elective right hip replacement    Anxiety/panic attacks  - Well-controlled on hydroxyzine as  needed.    Debility and weakness  - Patient had significant difficulty with care at home.  He is benefiting from nursing care in the facility currently.    Grief  - Patient counseled to help support emotions associated with loss of brother.  He was reminded to ensure he is not neglecting his health.    Memory loss  - Concerns for dementia.  Patient had missed an appointment with neurology as outpatient.  Wernicke's encephalopathy is a consideration.    Left renal cell carcinoma status post left nephrectomy/CAD stage IIIb  - renal function to be monitored. Cr stabilized to 1.07 by discharge at hospital.     Alcoholism  - Not drinking while in facility.  No signs of withdrawals.  - Continue thiamine 100 mg daily      [x]  Discussed Patient in detail with nursing/staff, addressed all needs today.     [x]  Plan of Care Reviewed   [x]  PT/OT Reviewed   []  Order Changes  []  Discharge Plans Reviewed  [x]  Advance Directive on file with Nursing Home.   [x]  POA on file with Nursing Home.    [x]  Code Status listed and reviewed.     52 min spent with direct patient care, review of medical chart, discussion with nursing staff, and medical decision making.       Robert Salguero DO.  6/27/2024      **Part of this note may be an electronic transcription/translation of spoken language to printed text using the Dragon Dictation System.**

## 2024-06-27 ENCOUNTER — NURSING HOME (OUTPATIENT)
Dept: INTERNAL MEDICINE | Facility: CLINIC | Age: 74
End: 2024-06-27
Payer: MEDICARE

## 2024-06-27 VITALS
SYSTOLIC BLOOD PRESSURE: 94 MMHG | OXYGEN SATURATION: 95 % | HEART RATE: 96 BPM | TEMPERATURE: 97.7 F | WEIGHT: 275.4 LBS | DIASTOLIC BLOOD PRESSURE: 62 MMHG | RESPIRATION RATE: 18 BRPM

## 2024-06-27 DIAGNOSIS — R53.81 DEBILITY: ICD-10-CM

## 2024-06-27 DIAGNOSIS — R41.3 MEMORY LOSS: ICD-10-CM

## 2024-06-27 DIAGNOSIS — Z90.5 H/O LEFT RADICAL NEPHRECTOMY: ICD-10-CM

## 2024-06-27 DIAGNOSIS — F41.9 ANXIETY: ICD-10-CM

## 2024-06-27 DIAGNOSIS — M25.50 POLYARTHRALGIA: ICD-10-CM

## 2024-06-27 DIAGNOSIS — F10.20 ALCOHOLISM: ICD-10-CM

## 2024-06-27 DIAGNOSIS — C64.2 RENAL CELL CARCINOMA, LEFT: ICD-10-CM

## 2024-06-27 DIAGNOSIS — M25.551 RIGHT HIP PAIN: Primary | ICD-10-CM

## 2024-06-27 PROCEDURE — 99306 1ST NF CARE HIGH MDM 50: CPT | Performed by: INTERNAL MEDICINE

## 2024-06-27 NOTE — LETTER
Nursing Home History and Physical       Robert Salguero DO  [address] [place] [address] Phone: [phone]  Fax: [phone]     PATIENT NAME: Sen Arellano                                                                          YOB: 1950           DATE OF SERVICE: 2024  FACILITY:  [facility]    CHIEF COMPLAINT:  Nursing facility admission      HISTORY OF PRESENT ILLNESS:      Patient is a 74-year-old male with a history of renal cell cancer status post left nephrectomy, hypertension, hyperlipidemia, cognitive impairment, RAFAEL, and alcoholism recently hospitalized at [facility] for progressive right hip pain over 1 month span.  Patient was hospitalized and treated with pain management for acute symptoms and arrangements were made for further follow-up as outpatient with orthopedics.  Due to debility and weakness, patient was transferred to this facility for further care and management.    On exam today, patient was sitting up comfortably in his wheelchair and appeared to be content but was in brief due to the loss of his brother who  earlier this morning in the hospital.  Family was at bedside and was supportive.  Patient did share that he does have an appointment with orthopedic surgery next week to consider appropriate options.  For now, his pain appears to be in reasonable control.  Hydroxyzine was started in the nursing facility a couple of days ago due to panic attacks.  It seems to be working well for him so far.      PAST MEDICAL & SURGICAL HISTORY:   Past Medical History:   Diagnosis Date    Alcohol abuse     Bilateral knee pain     Gout     Hyperlipidemia     Hypertension     Mild cognitive impairment     RAFAEL (obstructive sleep apnea)     Osteoarthritis     Renal cell cancer, left     Right hip pain     Unilateral osteoarthritis resulting from hip dysplasia, right hip     Vitamin deficiency       Past Surgical History:   Procedure Laterality Date     JOINT REPLACEMENT      NEPHRECTOMY Left          MEDICATIONS:  I have reviewed and reconciled the patients medication list in the patients chart at the [facility] nursing [facility] on 06/27/2024.      ALLERGIES:  No Known Allergies      SOCIAL HISTORY:  Social History     Socioeconomic History    Marital status:    Tobacco Use    Smoking status: Never    Smokeless tobacco: Never   Substance and Sexual Activity    Alcohol use: Yes     Alcohol/week: 1.0 standard drink of alcohol     Types: 1 Shots of liquor per week     Comment: STATES THAT HE DRINKS 1 SHOT OF BOURBON A DAY AND ALSO DRINKS BEER.    Drug use: Never    Sexual activity: Defer       FAMILY HISTORY:  Family History   Problem Relation Age of Onset    Diabetes Other         REVIEW OF SYSTEMS:  Review of Systems   Constitutional:  Negative for chills, fatigue and fever.   HENT:  Negative for congestion, ear pain, rhinorrhea, sinus pressure and sore throat.    Eyes:  Negative for visual disturbance.   Respiratory:  Negative for cough, chest tightness, shortness of breath and wheezing.    Cardiovascular:  Negative for chest pain, palpitations and leg swelling.   Gastrointestinal:  Negative for abdominal pain, blood in stool, constipation, diarrhea, nausea and vomiting.   Endocrine: Negative for polydipsia and polyuria.   Genitourinary:  Negative for dysuria and hematuria.   Musculoskeletal:  Positive for arthralgias. Negative for back pain.   Skin:  Negative for rash.   Neurological:  Negative for dizziness, light-headedness, numbness and headaches.   Psychiatric/Behavioral:  Negative for dysphoric mood and sleep disturbance. The patient is not nervous/anxious.          PHYSICAL EXAMINATION:   VITAL SIGNS: BP 94/62   Pulse 96   Temp 97.7 °F (36.5 °C)   Resp 18   Wt 125 kg (275 lb 6.4 oz)   SpO2 95%     Physical Exam  Vitals and nursing note reviewed.   Constitutional:       Appearance: Normal appearance. He is well-developed.      Comments:  Wheelchair-bound elderly male in no acute distress   HENT:      Head: Normocephalic and atraumatic.      Nose: Nose normal.      Mouth/Throat:      Mouth: Mucous membranes are moist.      Pharynx: No oropharyngeal exudate.   Eyes:      General: No scleral icterus.     Conjunctiva/sclera: Conjunctivae normal.      Pupils: Pupils are equal, round, and reactive to light.   Neck:      Thyroid: No thyromegaly.   Cardiovascular:      Rate and Rhythm: Normal rate and regular rhythm.      Heart sounds: Normal heart sounds. No murmur heard.     No friction rub. No gallop.   Pulmonary:      Effort: Pulmonary effort is normal. No respiratory distress.      Breath sounds: Normal breath sounds. No wheezing.   Abdominal:      General: Bowel sounds are normal. There is no distension.      Palpations: Abdomen is soft.      Tenderness: There is no abdominal tenderness.   Musculoskeletal:         General: No deformity or signs of injury.      Cervical back: Normal range of motion and neck supple.   Lymphadenopathy:      Cervical: No cervical adenopathy.   Skin:     General: Skin is warm and dry.      Findings: No rash.   Neurological:      Mental Status: He is alert and oriented to person, place, and time.   Psychiatric:         Mood and Affect: Mood normal.         Behavior: Behavior normal.         RECORDS REVIEW:   Discharge summary [facility] 6/20/2024 reviewed    ASSESSMENT   Diagnoses and all orders for this visit:    1. Right hip pain (Primary)    2. Polyarthralgia    3. Anxiety    4. Debility    5. Memory loss    6. Renal cell carcinoma, left    7. H/O left radical nephrectomy    8. Alcoholism        PLAN    Right hip pain  - Continue PT and OT in rehab facility.  Pain is in reasonable control with current medication regimen.  Patient does have an appointment with orthopedics to discuss treatment options which may include elective right hip replacement    Anxiety/panic attacks  - Well-controlled on hydroxyzine as  needed.    Debility and weakness  - Patient had significant difficulty with care at home.  He is benefiting from nursing care in the facility currently.    Grief  - Patient counseled to help support emotions associated with loss of brother.  He was reminded to ensure he is not neglecting his health.    Memory loss  - Concerns for dementia.  Patient had missed an appointment with neurology as outpatient.  Wernicke's encephalopathy is a consideration.    Left renal cell carcinoma status post left nephrectomy/CAD stage IIIb  - renal function to be monitored. Cr stabilized to 1.07 by discharge at hospital.     Alcoholism  - Not drinking while in facility.  No signs of withdrawals.  - Continue thiamine 100 mg daily      [x]  Discussed Patient in detail with nursing/staff, addressed all needs today.     [x]  Plan of Care Reviewed   [x]  PT/OT Reviewed   []  Order Changes  []  Discharge Plans Reviewed  [x]  Advance Directive on file with Nursing Home.   [x]  POA on file with Nursing Home.    [x]  Code Status listed and reviewed.     52 min spent with direct patient care, review of medical chart, discussion with nursing staff, and medical decision making.       Robert Salguero DO.  6/27/2024      **Part of this note may be an electronic transcription/translation of spoken language to printed text using the Dragon Dictation System.**